# Patient Record
Sex: MALE | Race: WHITE | NOT HISPANIC OR LATINO | Employment: OTHER | URBAN - METROPOLITAN AREA
[De-identification: names, ages, dates, MRNs, and addresses within clinical notes are randomized per-mention and may not be internally consistent; named-entity substitution may affect disease eponyms.]

---

## 2017-10-07 ENCOUNTER — HOSPITAL ENCOUNTER (EMERGENCY)
Facility: CLINIC | Age: 67
Discharge: HOME | End: 2017-10-07
Attending: EMERGENCY MEDICINE | Admitting: EMERGENCY MEDICINE
Payer: COMMERCIAL

## 2017-10-07 VITALS
SYSTOLIC BLOOD PRESSURE: 154 MMHG | TEMPERATURE: 98.7 F | OXYGEN SATURATION: 94 % | RESPIRATION RATE: 18 BRPM | DIASTOLIC BLOOD PRESSURE: 97 MMHG

## 2017-10-07 DIAGNOSIS — F10.920 ALCOHOL INTOXICATION, UNCOMPLICATED (H): ICD-10-CM
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
## 2017-10-07 DIAGNOSIS — F10.120 ALCOHOL ABUSE WITH UNCOMPLICATED INTOXICATION (H): ICD-10-CM

## 2017-10-07 DIAGNOSIS — G20.A1 PARKINSON'S DISEASE (H): ICD-10-CM

## 2017-10-07 LAB
ALBUMIN SERPL-MCNC: 3.2 G/DL (ref 3.4–5)
ALP SERPL-CCNC: 90 U/L (ref 40–150)
ALT SERPL W P-5'-P-CCNC: 42 U/L (ref 0–70)
ANION GAP SERPL CALCULATED.3IONS-SCNC: 13 MMOL/L (ref 3–14)
AST SERPL W P-5'-P-CCNC: 109 U/L (ref 0–45)
BASOPHILS # BLD AUTO: 0 10E9/L (ref 0–0.2)
BASOPHILS NFR BLD AUTO: 0.2 %
BILIRUB SERPL-MCNC: 0.6 MG/DL (ref 0.2–1.3)
BUN SERPL-MCNC: 16 MG/DL (ref 7–30)
CALCIUM SERPL-MCNC: 8.2 MG/DL (ref 8.5–10.1)
CHLORIDE SERPL-SCNC: 102 MMOL/L (ref 94–109)
CO2 SERPL-SCNC: 20 MMOL/L (ref 20–32)
CREAT SERPL-MCNC: 0.86 MG/DL (ref 0.66–1.25)
DIFFERENTIAL METHOD BLD: ABNORMAL
EOSINOPHIL # BLD AUTO: 0 10E9/L (ref 0–0.7)
EOSINOPHIL NFR BLD AUTO: 0 %
ERYTHROCYTE [DISTWIDTH] IN BLOOD BY AUTOMATED COUNT: 14 % (ref 10–15)
ETHANOL SERPL-MCNC: 0.14 G/DL
GFR SERPL CREATININE-BSD FRML MDRD: 90 ML/MIN/1.7M2
GLUCOSE SERPL-MCNC: 132 MG/DL (ref 70–99)
HCT VFR BLD AUTO: 42.6 % (ref 40–53)
HGB BLD-MCNC: 14.9 G/DL (ref 13.3–17.7)
IMM GRANULOCYTES # BLD: 0 10E9/L (ref 0–0.4)
IMM GRANULOCYTES NFR BLD: 0.2 %
INR PPP: 0.99 (ref 0.86–1.14)
LIPASE SERPL-CCNC: 93 U/L (ref 73–393)
LYMPHOCYTES # BLD AUTO: 2 10E9/L (ref 0.8–5.3)
LYMPHOCYTES NFR BLD AUTO: 15.3 %
MCH RBC QN AUTO: 30.9 PG (ref 26.5–33)
MCHC RBC AUTO-ENTMCNC: 35 G/DL (ref 31.5–36.5)
MCV RBC AUTO: 88 FL (ref 78–100)
MONOCYTES # BLD AUTO: 1.8 10E9/L (ref 0–1.3)
MONOCYTES NFR BLD AUTO: 13.9 %
NEUTROPHILS # BLD AUTO: 9.3 10E9/L (ref 1.6–8.3)
NEUTROPHILS NFR BLD AUTO: 70.4 %
PLATELET # BLD AUTO: 307 10E9/L (ref 150–450)
POTASSIUM SERPL-SCNC: 3.9 MMOL/L (ref 3.4–5.3)
PROT SERPL-MCNC: 7.2 G/DL (ref 6.8–8.8)
RBC # BLD AUTO: 4.82 10E12/L (ref 4.4–5.9)
SODIUM SERPL-SCNC: 135 MMOL/L (ref 133–144)
WBC # BLD AUTO: 13.1 10E9/L (ref 4–11)

## 2017-10-07 PROCEDURE — 85610 PROTHROMBIN TIME: CPT | Performed by: EMERGENCY MEDICINE

## 2017-10-07 PROCEDURE — 25000128 H RX IP 250 OP 636: Performed by: EMERGENCY MEDICINE

## 2017-10-07 PROCEDURE — 80053 COMPREHEN METABOLIC PANEL: CPT | Performed by: EMERGENCY MEDICINE

## 2017-10-07 PROCEDURE — 83690 ASSAY OF LIPASE: CPT | Performed by: EMERGENCY MEDICINE

## 2017-10-07 PROCEDURE — 25000132 ZZH RX MED GY IP 250 OP 250 PS 637: Performed by: EMERGENCY MEDICINE

## 2017-10-07 PROCEDURE — 80320 DRUG SCREEN QUANTALCOHOLS: CPT | Performed by: EMERGENCY MEDICINE

## 2017-10-07 PROCEDURE — 99284 EMERGENCY DEPT VISIT MOD MDM: CPT | Mod: Z6 | Performed by: EMERGENCY MEDICINE

## 2017-10-07 PROCEDURE — 99284 EMERGENCY DEPT VISIT MOD MDM: CPT | Mod: 25 | Performed by: EMERGENCY MEDICINE

## 2017-10-07 PROCEDURE — 96372 THER/PROPH/DIAG INJ SC/IM: CPT | Performed by: EMERGENCY MEDICINE

## 2017-10-07 PROCEDURE — 85025 COMPLETE CBC W/AUTO DIFF WBC: CPT | Performed by: EMERGENCY MEDICINE

## 2017-10-07 RX ORDER — THIAMINE HYDROCHLORIDE 100 MG/ML
100 INJECTION, SOLUTION INTRAMUSCULAR; INTRAVENOUS ONCE
Status: COMPLETED | OUTPATIENT
Start: 2017-10-07 | End: 2017-10-07

## 2017-10-07 RX ORDER — CARBIDOPA AND LEVODOPA 25; 100 MG/1; MG/1
2 TABLET ORAL ONCE
Status: COMPLETED | OUTPATIENT
Start: 2017-10-07 | End: 2017-10-07

## 2017-10-07 RX ORDER — CARBIDOPA/LEVODOPA 10MG-100MG
1 TABLET ORAL 3 TIMES DAILY
Status: DISCONTINUED | OUTPATIENT
Start: 2017-10-08 | End: 2017-10-07 | Stop reason: CLARIF

## 2017-10-07 RX ADMIN — THIAMINE HYDROCHLORIDE 100 MG: 100 INJECTION, SOLUTION INTRAMUSCULAR; INTRAVENOUS at 21:33

## 2017-10-07 RX ADMIN — CARBIDOPA AND LEVODOPA 2 TABLET: 25; 100 TABLET ORAL at 22:08

## 2017-10-07 NOTE — ED AVS SNAPSHOT
[facility] Emergency Department    [address] [address]    Phone:  [phone]    Fax:  [phone]                                       Arturo Broderick   MRN: [id number]    Department:  [facility] Emergency Department   Date of Visit:  10/7/2017           After Visit Summary Signature Page     I have received my discharge instructions, and my questions have been answered. I have discussed any challenges I see with this plan with the nurse or doctor.    ..........................................................................................................................................  Patient/Patient Representative Signature      ..........................................................................................................................................  Patient Representative Print Name and Relationship to Patient    ..................................................               ................................................  Date                                            Time    ..........................................................................................................................................  Reviewed by Signature/Title    ...................................................              ..............................................  Date                                                            Time

## 2017-10-07 NOTE — ED AVS SNAPSHOT
[facility]    [address]    [facility] [address]    Phone:  [phone]    Fax:  [phone]                                       Arturo Broderick   MRN: [id number]    Department:  [facility] [place] [facility]   Date of Visit:  10/7/2017           Patient Information     Date Of Birth          1950        Your diagnoses for this visit were:     Parkinson's disease (H)     Alcohol intoxication, uncomplicated (H)        You were seen by Blake Reynoso MD.      Follow-up Information     Follow up with Lazara Castillo NP.    Why:  As needed    Contact information:    [facility]  [address]   [facility] [address]  [phone]          Discharge Instructions         Alcohol Intoxication  Alcohol intoxication occurs when you drink alcohol faster than your liver can remove it from your system. The following facts are important to remember:    It can take 10 minutes or more to start to feel the effects of a drink, so you can easily get more intoxicated than you intended.    One drink may be more than 1 serving of alcohol. Depending on the drink, it can be 2 to 4 servings.    It takes about an hour for your body to metabolize (clear) 1 serving. If you have more than 1 drink, it can take a couple of hours or more.    Many things affect how drinks will affect you, including whether you ve eaten, how fast you drink, your size, how much you normally drink (or not), medicines you take, chronic diseases you have, and gender.  Signs and symptoms of alcohol poisoning  The following are signs and symptoms of alcohol poisoning:  Mild impairment    Reduced inhibitions    Slurred speech    Drowsiness    Decreased fine motor skills  Moderate impairment    Erratic behavior, aggression, depression    Impaired judgment    Confusion    Concentration difficulties    Coordination problems  Severe impairment    Vomiting    Seizures    Unconsciousness    Cold, clammy    Slow or  "irregular breathing    Hypothermia (low body temperature)    Coma  Health effects  Alcohol abuse causes health problems. Sometimes this can happen after only drinking a  little.\" There is no set number of drinks or amount of alcohol that defines too much. The more you drink at one time, and the more frequently you drink determine both the short-term and long-term health effects. It affects all parts of your body and your health, including your:    Brain. Alcohol is a central nervous system depressant. It can damage parts of the brain that affect your balance, memory, thinking, and emotions. It can cause memory loss, blackouts, depression, agitation, sleep cycle changes, and seizures. These changes may or may not be reversible.    Heart and vascular system. Alcohol affects multiple areas. It can damage heart muscle causing cardiomyopathy, which is a weakening and stretching of the heart muscle. This can lead to trouble breathing, an irregular heartbeat, atrial fibrillation, leg swelling, and heart failure. It makes the blood vessels stiffen causing hypertension (high blood pressure). All of these problems increase your risk of having heart attacks or strokes.    Liver. Alcohol causes fat to build up in the liver, affecting its normal function. This increases the risk for hepatitis, leading to abdominal pain, appetite loss, jaundice, bleeding problems, liver fibrosis, and cirrhosis. This in turn can affect your ability to fight off infections, and can cause diabetes. The liver changes prevent it from removing toxins in your blood that can cause encephalopathy. Signs of this are confusion, altered level of consciousness, personality changes, memory loss, seizures, coma, and death.    Pancreas. Alcohol can cause inflammation of the pancreas, or pancreatitis. This can cause pain in your abdomen, fever, and diabetes.    Immune system. Alcohol weakens your immune system in a number of ways. It suppresses your immune system " making it harder to fight off infections and colds. You will also have a higher risk of certain infections like pneumonia and tuberculosis.    Cancer risk. Alcohol raises your risk of cancer of the mouth, esophagus, pharynx, larynx, liver, and breast.    Sexual function. Alcohol abuse can also lead to sexual problems.  Alcohol use during pregnancy may cause permanent damage to the growing baby.  Home care  The following guidelines will help you care for yourself at home:    Don't drink any more alcohol.    Don't drive until all effects of the alcohol have worn off.    Don't operate machinery that can cause injuries.    Get lots of rest over the next few days. Drink plenty of water and other non-alcoholic liquids. Try to eat regular meals.    If you have been drinking heavily on a daily basis, you may go through alcohol withdrawal. The usual symptoms last 3 to 4 days and may include nervousness, shakiness, nausea, sweating, sleeplessness, and can even cause seizures and a serious withdrawal symptom called delirium tremens, or DTs. During this time, it is best that you stay with family or friends who can help and support you. You can also admit yourself to a residential detox program. If your symptoms are severe (seizures, severe shakiness, confusion), contact your doctor or call an ambulance for help (see below).   Follow-up care  If alcohol is a problem in your life, these are some organizations that can help you:    Alcoholics Anonymous offers support through a self-help fellowship. There are no dues or fees. See the Yellow Pages and call for time and place of meetings. Find AA online at www.aa.org.    [facility] offers support to families of alcohol users. Contact 033-868-2708, or online at www.al-lee.org.    National [place] on Alcoholism and Drug Dependence can be reached at 394-389-5333, or online at www.ncadd.org.    There are also inpatient and residential alcohol detox programs. Check the Internet or phonebook  Yellow Pages under  Drug Abuse and Treatment Centers.   Call 911  Call 911 if any of these occur:    Trouble breathing or slow irregular breathing    Chest pain    Sudden weakness on one side of your body or sudden trouble speaking    Heavy bleeding or vomiting blood    Very drowsy or trouble awakening    Fainting or loss of consciousness    Rapid heart rate    Seizure  When to seek medical advice  Call your healthcare provider right away if any of these occur:    Severe shakiness     Fever over 100.4  F (38.0  C)    Confusion or hallucinations (seeing, hearing, or feeling things that are not there)    Pain in your upper abdomen that gets worse    Repeated vomiting  Date Last Reviewed: 6/1/2016 2000-2017 [employer]. [address] [place] [address], [place] [address]. All rights reserved. This information is not intended as a substitute for professional medical care. Always follow your healthcare professional's instructions.          24 Hour Appointment Hotline       To make an appointment at any [facility], call [phone] ([phone]). If you don't have a family doctor or clinic, we will help you find one. [facility] clinics are conveniently located to serve the needs of you and your family.             Review of your medicines      Our records show that you are taking the medicines listed below. If these are incorrect, please call your family doctor or clinic.        Dose / Directions Last dose taken    carbidopa-levodopa  MG per tablet   Commonly known as:  SINEMET   Dose:  1 tablet        Take 1 tablet by mouth 3 times daily   Refills:  0        LISINOPRIL PO        Refills:  0        METFORMIN HCL PO        Refills:  0        SIMVASTATIN PO        Refills:  0        TRAZODONE HCL PO        Refills:  0        VENLAFAXINE HCL PO        Refills:  0                Procedures and tests performed during your visit     Alcohol ethyl    CBC with platelets differential    Comprehensive  metabolic panel    INR    Lipase      Orders Needing Specimen Collection     None      Pending Results     No orders found from 10/5/2017 to 10/8/2017.            Pending Culture Results     No orders found from 10/5/2017 to 10/8/2017.            Pending Results Instructions     If you had any lab results that were not finalized at the time of your Discharge, you can call the ED Lab Result RN at [phone]. You will be contacted by this team for any positive Lab results or changes in treatment. The nurses are available 7 days a week from 10A to 6:30P.  You can leave a message 24 hours per day and they will return your call.        Test Results From Your Hospital Stay        10/7/2017  9:47 PM      Component Results     Component Value Ref Range & Units Status    WBC 13.1 (H) 4.0 - 11.0 10e9/L Final    RBC Count 4.82 4.4 - 5.9 10e12/L Final    Hemoglobin 14.9 13.3 - 17.7 g/dL Final    Hematocrit 42.6 40.0 - 53.0 % Final    MCV 88 78 - 100 fl Final    MCH 30.9 26.5 - 33.0 pg Final    MCHC 35.0 31.5 - 36.5 g/dL Final    RDW 14.0 10.0 - 15.0 % Final    Platelet Count 307 150 - 450 10e9/L Final    Diff Method Automated Method  Final    % Neutrophils 70.4 % Final    % Lymphocytes 15.3 % Final    % Monocytes 13.9 % Final    % Eosinophils 0.0 % Final    % Basophils 0.2 % Final    % Immature Granulocytes 0.2 % Final    Absolute Neutrophil 9.3 (H) 1.6 - 8.3 10e9/L Final    Absolute Lymphocytes 2.0 0.8 - 5.3 10e9/L Final    Absolute Monocytes 1.8 (H) 0.0 - 1.3 10e9/L Final    Absolute Eosinophils 0.0 0.0 - 0.7 10e9/L Final    Absolute Basophils 0.0 0.0 - 0.2 10e9/L Final    Abs Immature Granulocytes 0.0 0 - 0.4 10e9/L Final         10/7/2017 10:00 PM      Component Results     Component Value Ref Range & Units Status    INR 0.99 0.86 - 1.14 Final         10/7/2017 10:01 PM      Component Results     Component Value Ref Range & Units Status    Sodium 135 133 - 144 mmol/L Final    Potassium 3.9 3.4 - 5.3 mmol/L Final     "Chloride 102 94 - 109 mmol/L Final    Carbon Dioxide 20 20 - 32 mmol/L Final    Anion Gap 13 3 - 14 mmol/L Final    Glucose 132 (H) 70 - 99 mg/dL Final    Urea Nitrogen 16 7 - 30 mg/dL Final    Creatinine 0.86 0.66 - 1.25 mg/dL Final    GFR Estimate 90 >60 mL/min/1.7m2 Final    Non  GFR Calc    GFR Estimate If Black >90 >60 mL/min/1.7m2 Final    African American GFR Calc    Calcium 8.2 (L) 8.5 - 10.1 mg/dL Final    Bilirubin Total 0.6 0.2 - 1.3 mg/dL Final    Albumin 3.2 (L) 3.4 - 5.0 g/dL Final    Protein Total 7.2 6.8 - 8.8 g/dL Final    Alkaline Phosphatase 90 40 - 150 U/L Final    ALT 42 0 - 70 U/L Final     (H) 0 - 45 U/L Final         10/7/2017  9:58 PM      Component Results     Component Value Ref Range & Units Status    Lipase 93 73 - 393 U/L Final         10/7/2017  9:58 PM      Component Results     Component Value Ref Range & Units Status    Ethanol g/dL 0.14 (H) <0.01 g/dL Final                Thank you for choosing [facility]       Thank you for choosing [facility] for your care. Our goal is always to provide you with excellent care. Hearing back from our patients is one way we can continue to improve our services. Please take a few minutes to complete the written survey that you may receive in the mail after you visit with us. Thank you!        [employer] Information     [employer] lets you send messages to your doctor, view your test results, renew your prescriptions, schedule appointments and more. To sign up, go to [email or web address] . Click on \"Log in\" on the left side of the screen, which will take you to the Welcome page. Then click on \"Sign up Now\" on the right side of the page.     You will be asked to enter the access code listed below, as well as some personal information. Please follow the directions to create your username and password.     Your access code is: [email or web address]  Expires: 2018 10:50 PM     Your access code will  in 90 days. If you need help or a new " code, please call your [facility] clinic or [phone].        Care EveryWhere ID     This is your Care EveryWhere ID. This could be used by other organizations to access your [facility] medical records  [id number]        Equal Access to Services     FARHAT TURNER : Arnie richards Soalize, waaxda luqadaha, qaybta kaalmada soledad, delicia mckeon. So [facility] [phone].    ATENCIÓN: Si habla español, tiene a cazares disposición servicios gratuitos de asistencia lingüística. Llame al [phone].    We comply with applicable federal civil rights laws and [place] laws. We do not discriminate on the basis of race, color, national origin, age, disability, sex, sexual orientation, or gender identity.            After Visit Summary       This is your record. Keep this with you and show to your community pharmacist(s) and doctor(s) at your next visit.

## 2017-10-08 NOTE — ED PROVIDER NOTES
History     Chief Complaint   Patient presents with     Alcohol Intoxication     EMS CALLED OUT TO ASSIST OUT OF CHAIR, HOME ALONE, TOO INTOXICATED TO STAY THERE     HPI  Arturo Broderick is a 66 year old male with a past medical history of Parkinson's disease, on carbidopa-levodopa, who presents to the ED via EMS for the evaluation of alcohol intoxication. Patient states he had 3-4 drinks tonight. Due to his intoxicated state and Parkinson's disease, patient was unable to stand up after falling out of bed earlier tonight, at which point he called 911.  He denies injury with fall.  He reports being able to get into a chair before EMS arrival but being unable to get out of the chair without assistance. He admits to drinking most days, but denies any problems with alcohol dependence and overuse.  Paramedics were quite concerned and the patient would not contact family to come watch him so they brought him in for evaluation and possible transfer to detox.  Patient is concerned with his parkinsonism that he does not have his evening meds and he is due.  We will provide this for him.  Currently he denies headache, chest pain, shortness of breath, abdominal pain, diarrhea or dysuria.  No fever or chills.    Past Medical History:   Diagnosis Date     Arthritis      Parkinson's disease (H) 2011     There is no problem list on file for this patient.    No current facility-administered medications for this encounter.      Current Outpatient Prescriptions   Medication     carbidopa-levodopa (SINEMET)  MG per tablet     LISINOPRIL PO     SIMVASTATIN PO     VENLAFAXINE HCL PO     METFORMIN HCL PO     TRAZODONE HCL PO      No Known Allergies  Social History     Social History     Marital status:      Spouse name: N/A     Number of children: N/A     Years of education: N/A     Occupational History     Not on file.     Social History Main Topics     Smoking status: Never Smoker     Smokeless tobacco: Not on file      Alcohol use Not on file     Drug use: Not on file     Sexual activity: Not on file     Other Topics Concern     Not on file     Social History Narrative     No narrative on file     Family History   Problem Relation Age of Onset     Dementia Mother      Unknown/Adopted No family hx of      Depression No family hx of      Anxiety Disorder No family hx of      Schizophrenia No family hx of      Bipolar Disorder No family hx of      Suicide No family hx of      Substance Abuse No family hx of      [place] Disease No family hx of      Parkinsonism No family hx of      Autism Spectrum Disorder No family hx of      Intellectual Disability (Mental Retardation) No family hx of      MENTAL ILLNESS No family hx of      I have reviewed the Medications, Allergies, Past Medical and Surgical History, and Social History in the Epic system.    Review of Systems   Constitutional:        Positive for alcohol intoxication   All other systems reviewed and are negative.      Physical Exam   BP: (!) 155/97  Heart Rate: 116  Temp: 98.7  F (37.1  C)  Resp: 18  SpO2: 95 %  Physical Exam general alert pleasant and cooperative male in mild distress.  HEENT does have flat facies consistent with his parkinsonism.  No limitation in movement of his neck.  Lungs were clear without adventitious sounds.  Cardiac regular without murmur.  Abdomen is obese with active bowel sounds on palpation no localizing upper abdominal tenderness.  No organomegaly or masses.  Neurologically he does have some rigidity and cogwheeling otherwise nonfocal.    ED Course     ED Course     Procedures             Critical Care time:  none               Labs Ordered and Resulted from Time of ED Arrival Up to the Time of Departure from the ED   CBC WITH PLATELETS DIFFERENTIAL - Abnormal; Notable for the following:        Result Value    WBC 13.1 (*)     Absolute Neutrophil 9.3 (*)     Absolute Monocytes 1.8 (*)     All other components within normal limits   COMPREHENSIVE  METABOLIC PANEL - Abnormal; Notable for the following:     Glucose 132 (*)     Calcium 8.2 (*)     Albumin 3.2 (*)      (*)     All other components within normal limits   ALCOHOL ETHYL - Abnormal; Notable for the following:     Ethanol g/dL 0.14 (*)     All other components within normal limits   INR   LIPASE     Results for orders placed or performed during the hospital encounter of 10/07/17 (from the past 24 hour(s))   CBC with platelets differential   Result Value Ref Range    WBC 13.1 (H) 4.0 - 11.0 10e9/L    RBC Count 4.82 4.4 - 5.9 10e12/L    Hemoglobin 14.9 13.3 - 17.7 g/dL    Hematocrit 42.6 40.0 - 53.0 %    MCV 88 78 - 100 fl    MCH 30.9 26.5 - 33.0 pg    MCHC 35.0 31.5 - 36.5 g/dL    RDW 14.0 10.0 - 15.0 %    Platelet Count 307 150 - 450 10e9/L    Diff Method Automated Method     % Neutrophils 70.4 %    % Lymphocytes 15.3 %    % Monocytes 13.9 %    % Eosinophils 0.0 %    % Basophils 0.2 %    % Immature Granulocytes 0.2 %    Absolute Neutrophil 9.3 (H) 1.6 - 8.3 10e9/L    Absolute Lymphocytes 2.0 0.8 - 5.3 10e9/L    Absolute Monocytes 1.8 (H) 0.0 - 1.3 10e9/L    Absolute Eosinophils 0.0 0.0 - 0.7 10e9/L    Absolute Basophils 0.0 0.0 - 0.2 10e9/L    Abs Immature Granulocytes 0.0 0 - 0.4 10e9/L   INR   Result Value Ref Range    INR 0.99 0.86 - 1.14   Comprehensive metabolic panel   Result Value Ref Range    Sodium 135 133 - 144 mmol/L    Potassium 3.9 3.4 - 5.3 mmol/L    Chloride 102 94 - 109 mmol/L    Carbon Dioxide 20 20 - 32 mmol/L    Anion Gap 13 3 - 14 mmol/L    Glucose 132 (H) 70 - 99 mg/dL    Urea Nitrogen 16 7 - 30 mg/dL    Creatinine 0.86 0.66 - 1.25 mg/dL    GFR Estimate 90 >60 mL/min/1.7m2    GFR Estimate If Black >90 >60 mL/min/1.7m2    Calcium 8.2 (L) 8.5 - 10.1 mg/dL    Bilirubin Total 0.6 0.2 - 1.3 mg/dL    Albumin 3.2 (L) 3.4 - 5.0 g/dL    Protein Total 7.2 6.8 - 8.8 g/dL    Alkaline Phosphatase 90 40 - 150 U/L    ALT 42 0 - 70 U/L     (H) 0 - 45 U/L   Lipase   Result Value Ref  Range    Lipase 93 73 - 393 U/L   Alcohol ethyl   Result Value Ref Range    Ethanol g/dL 0.14 (H) <0.01 g/dL       Medications   thiamine (B-1) injection 100 mg (100 mg Intramuscular Given 10/7/17 2133)   carbidopa-levodopa (SINEMET)  MG per tablet 2 tablet (2 tablets Oral Given 10/7/17 2208)       9:27 PM Patient assessed.  Blood work was obtained.   Thiamine IM is ordered  11 PM the son did present and I spoke with him independently.  He is willing to assume his father's care.  He admits that his father does have a problem with alcohol and thinks he is an alcoholic.  He apparently has been through treatment recently.  At this point the son does not want further information regarding treatments as he is currently in the system.  Assessments & Plan (with Medical Decision Making)   Patient is a 66-year-old male presents by ambulance after he had inability to get himself off the floor after falling.  No injury with the fall.  Patient has parkinsonism.  He also admits to drinking today and due to this the paramedics did not feel comfortable leaving in by himself.  He would not call his son at that time.  He presents now without complaints but does have concerns as he needs to have his Sinemet dose.  This was provided for him.  Blood work was checked and  he was intoxicated with alcohol level of 0.14.  At this point patient was willing to contact his son and did so.  His son will present and assumed care.  Patient does not feel alcohols problem and does not want treatment.  He's never had withdrawal symptoms, blackouts, or DUI.  He is given information on alcohol intoxication.  Reasons to return to the emergency room distress.  I have reviewed the nursing notes.    I have reviewed the findings, diagnosis, plan and need for follow up with the patient.       New Prescriptions    No medications on file       Final diagnoses:   Parkinson's disease (H)   Alcohol intoxication, uncomplicated (H)     This document serves  as a record of the services and decisions personally performed and made by Blake Reynoso MD. It was created on HIS/HER behalf by   Víctor Abel, a trained medical scribe. The creation of this document is based the provider's statements to the medical scribe.  Víctor Abel 8:39 PM 10/7/2017    Provider:   The information in this document, created by the medical scribe for me, accurately reflects the services I personally performed and the decisions made by me. I have reviewed and approved this document for accuracy prior to leaving the patient care area.  Blake Reynoso MD 8:39 PM 10/7/2017    10/7/2017   [facility] EMERGENCY DEPARTMENT     Blake Reynoso MD  10/07/17 8711

## 2017-10-08 NOTE — ED NOTES
Here due to intoxication because did not want to call son to be with him, explained that he would either have to call son or other responsible adult to go home so that they could stay with him or we could send him to detox where they could watch him.

## 2017-10-08 NOTE — ED NOTES
Discussed Sinemet order with MD.  Verified with care everywhere and patient that dose is 2 tablets of 25/100.

## 2017-10-08 NOTE — DISCHARGE INSTRUCTIONS
"  Alcohol Intoxication  Alcohol intoxication occurs when you drink alcohol faster than your liver can remove it from your system. The following facts are important to remember:    It can take 10 minutes or more to start to feel the effects of a drink, so you can easily get more intoxicated than you intended.    One drink may be more than 1 serving of alcohol. Depending on the drink, it can be 2 to 4 servings.    It takes about an hour for your body to metabolize (clear) 1 serving. If you have more than 1 drink, it can take a couple of hours or more.    Many things affect how drinks will affect you, including whether you ve eaten, how fast you drink, your size, how much you normally drink (or not), medicines you take, chronic diseases you have, and gender.  Signs and symptoms of alcohol poisoning  The following are signs and symptoms of alcohol poisoning:  Mild impairment    Reduced inhibitions    Slurred speech    Drowsiness    Decreased fine motor skills  Moderate impairment    Erratic behavior, aggression, depression    Impaired judgment    Confusion    Concentration difficulties    Coordination problems  Severe impairment    Vomiting    Seizures    Unconsciousness    Cold, clammy    Slow or irregular breathing    Hypothermia (low body temperature)    Coma  Health effects  Alcohol abuse causes health problems. Sometimes this can happen after only drinking a  little.\" There is no set number of drinks or amount of alcohol that defines too much. The more you drink at one time, and the more frequently you drink determine both the short-term and long-term health effects. It affects all parts of your body and your health, including your:    Brain. Alcohol is a central nervous system depressant. It can damage parts of the brain that affect your balance, memory, thinking, and emotions. It can cause memory loss, blackouts, depression, agitation, sleep cycle changes, and seizures. These changes may or may not be " reversible.    Heart and vascular system. Alcohol affects multiple areas. It can damage heart muscle causing cardiomyopathy, which is a weakening and stretching of the heart muscle. This can lead to trouble breathing, an irregular heartbeat, atrial fibrillation, leg swelling, and heart failure. It makes the blood vessels stiffen causing hypertension (high blood pressure). All of these problems increase your risk of having heart attacks or strokes.    Liver. Alcohol causes fat to build up in the liver, affecting its normal function. This increases the risk for hepatitis, leading to abdominal pain, appetite loss, jaundice, bleeding problems, liver fibrosis, and cirrhosis. This in turn can affect your ability to fight off infections, and can cause diabetes. The liver changes prevent it from removing toxins in your blood that can cause encephalopathy. Signs of this are confusion, altered level of consciousness, personality changes, memory loss, seizures, coma, and death.    Pancreas. Alcohol can cause inflammation of the pancreas, or pancreatitis. This can cause pain in your abdomen, fever, and diabetes.    Immune system. Alcohol weakens your immune system in a number of ways. It suppresses your immune system making it harder to fight off infections and colds. You will also have a higher risk of certain infections like pneumonia and tuberculosis.    Cancer risk. Alcohol raises your risk of cancer of the mouth, esophagus, pharynx, larynx, liver, and breast.    Sexual function. Alcohol abuse can also lead to sexual problems.  Alcohol use during pregnancy may cause permanent damage to the growing baby.  Home care  The following guidelines will help you care for yourself at home:    Don't drink any more alcohol.    Don't drive until all effects of the alcohol have worn off.    Don't operate machinery that can cause injuries.    Get lots of rest over the next few days. Drink plenty of water and other non-alcoholic liquids.  Try to eat regular meals.    If you have been drinking heavily on a daily basis, you may go through alcohol withdrawal. The usual symptoms last 3 to 4 days and may include nervousness, shakiness, nausea, sweating, sleeplessness, and can even cause seizures and a serious withdrawal symptom called delirium tremens, or DTs. During this time, it is best that you stay with family or friends who can help and support you. You can also admit yourself to a residential detox program. If your symptoms are severe (seizures, severe shakiness, confusion), contact your doctor or call an ambulance for help (see below).   Follow-up care  If alcohol is a problem in your life, these are some organizations that can help you:    Alcoholics Anonymous offers support through a self-help fellowship. There are no dues or fees. See the Yellow Pages and call for time and place of meetings. Find AA online at www.aa.org.    [facility] offers support to families of alcohol users. Contact 281-743-9004, or online at www.al-anodayton.org.    [employer] [place] on Alcoholism and Drug Dependence can be reached at 988-765-5130, or online at www.ncadd.org.    There are also inpatient and residential alcohol detox programs. Check the Internet or phonebook Yellow Pages under  D[facility] Treatment [facility].   Call 911  Call 911 if any of these occur:    Trouble breathing or slow irregular breathing    Chest pain    Sudden weakness on one side of your body or sudden trouble speaking    Heavy bleeding or vomiting blood    Very drowsy or trouble awakening    Fainting or loss of consciousness    Rapid heart rate    Seizure  When to seek medical advice  Call your healthcare provider right away if any of these occur:    Severe shakiness     Fever over 100.4  F (38.0  C)    Confusion or hallucinations (seeing, hearing, or feeling things that are not there)    Pain in your upper abdomen that gets worse    Repeated vomiting  Date Last Reviewed: 6/1/2016 2000-2017 [employer]  [employer]. [address] [place] [address], [place] [address]. All rights reserved. This information is not intended as a substitute for professional medical care. Always follow your healthcare professional's instructions.

## 2023-04-08 ENCOUNTER — APPOINTMENT (OUTPATIENT)
Dept: GENERAL RADIOLOGY | Facility: CLINIC | Age: 73
End: 2023-04-08
Attending: FAMILY MEDICINE
Payer: COMMERCIAL

## 2023-04-08 ENCOUNTER — HOSPITAL ENCOUNTER (EMERGENCY)
Facility: CLINIC | Age: 73
Discharge: HOME | End: 2023-04-08
Attending: FAMILY MEDICINE | Admitting: FAMILY MEDICINE
Payer: COMMERCIAL

## 2023-04-08 VITALS
HEART RATE: 84 BPM | WEIGHT: 210 LBS | HEIGHT: 68 IN | OXYGEN SATURATION: 98 % | SYSTOLIC BLOOD PRESSURE: 156 MMHG | TEMPERATURE: 97.8 F | DIASTOLIC BLOOD PRESSURE: 87 MMHG | RESPIRATION RATE: 16 BRPM | BODY MASS INDEX: 31.83 KG/M2

## 2023-04-08 DIAGNOSIS — S60.221A CONTUSION OF RIGHT HAND INCLUDING FINGERS, INITIAL ENCOUNTER: ICD-10-CM

## 2023-04-08 DIAGNOSIS — S60.00XA CONTUSION OF RIGHT HAND INCLUDING FINGERS, INITIAL ENCOUNTER: ICD-10-CM

## 2023-04-08 DIAGNOSIS — S62.502B: ICD-10-CM

## 2023-04-08 DIAGNOSIS — S59.902A ELBOW INJURY, LEFT, INITIAL ENCOUNTER: ICD-10-CM

## 2023-04-08 PROCEDURE — 73070 X-RAY EXAM OF ELBOW: CPT | Mod: LT

## 2023-04-08 PROCEDURE — 73130 X-RAY EXAM OF HAND: CPT | Mod: LT

## 2023-04-08 PROCEDURE — 99285 EMERGENCY DEPT VISIT HI MDM: CPT | Performed by: FAMILY MEDICINE

## 2023-04-08 PROCEDURE — 26750 TREAT FINGER FRACTURE EACH: CPT | Mod: 54 | Performed by: FAMILY MEDICINE

## 2023-04-08 PROCEDURE — 99284 EMERGENCY DEPT VISIT MOD MDM: CPT | Mod: 25 | Performed by: FAMILY MEDICINE

## 2023-04-08 PROCEDURE — 12002 RPR S/N/AX/GEN/TRNK2.6-7.5CM: CPT | Performed by: FAMILY MEDICINE

## 2023-04-08 PROCEDURE — 26750 TREAT FINGER FRACTURE EACH: CPT | Mod: LT | Performed by: FAMILY MEDICINE

## 2023-04-08 PROCEDURE — 250N000013 HC RX MED GY IP 250 OP 250 PS 637: Performed by: FAMILY MEDICINE

## 2023-04-08 RX ORDER — HYDROCODONE BITARTRATE AND ACETAMINOPHEN 5; 325 MG/1; MG/1
2 TABLET ORAL ONCE
Status: COMPLETED | OUTPATIENT
Start: 2023-04-08 | End: 2023-04-08

## 2023-04-08 RX ORDER — CEPHALEXIN 500 MG/1
500 CAPSULE ORAL 2 TIMES DAILY
Qty: 20 CAPSULE | Refills: 0 | Status: SHIPPED | OUTPATIENT
Start: 2023-04-08

## 2023-04-08 RX ORDER — OXYCODONE AND ACETAMINOPHEN 5; 325 MG/1; MG/1
1 TABLET ORAL EVERY 6 HOURS PRN
Qty: 12 TABLET | Refills: 0 | Status: SHIPPED | OUTPATIENT
Start: 2023-04-08

## 2023-04-08 RX ORDER — OXYCODONE HYDROCHLORIDE 5 MG/1
5 TABLET ORAL EVERY 6 HOURS PRN
Qty: 6 TABLET | Refills: 0 | Status: SHIPPED | OUTPATIENT
Start: 2023-04-08 | End: 2023-04-08

## 2023-04-08 RX ORDER — CEPHALEXIN 500 MG/1
500 CAPSULE ORAL ONCE
Status: COMPLETED | OUTPATIENT
Start: 2023-04-08 | End: 2023-04-08

## 2023-04-08 RX ORDER — HYDROCODONE BITARTRATE AND ACETAMINOPHEN 5; 325 MG/1; MG/1
1-2 TABLET ORAL EVERY 6 HOURS PRN
Qty: 10 TABLET | Refills: 0 | Status: SHIPPED | OUTPATIENT
Start: 2023-04-08 | End: 2023-04-08

## 2023-04-08 RX ADMIN — HYDROCODONE BITARTRATE AND ACETAMINOPHEN 2 TABLET: 5; 325 TABLET ORAL at 21:08

## 2023-04-08 RX ADMIN — CEPHALEXIN 500 MG: 500 CAPSULE ORAL at 22:42

## 2023-04-08 ASSESSMENT — ACTIVITIES OF DAILY LIVING (ADL)
ADLS_ACUITY_SCORE: 35
ADLS_ACUITY_SCORE: 35

## 2023-04-09 NOTE — ED PROVIDER NOTES
HPI   Patient is a 72-year-old man presenting by private car with his wife after a fall.  He has a known history of Parkinson's disease.  This is advanced and he is scheduled for a deep brain stimulator.  He does not take medication for anticoagulation.  The rest of his medical history is reviewed within the care everywhere section.    The patient was walking out of his home when he slipped on the bottom stair and fell forward, landing onto the pavement.  He denies head trauma or LOC.  He denies neck or back pain.  He denies chest pain or shortness of breath.  He denies abdominal pain.  He was helped to his feet by bystanders and he was able to bear weight and ambulate slowly after the fall.  He reports moderate pain involving his left thumb.  There is an obvious laceration with bleeding and deformity.  He has swelling of his right hand over the third MCP joint.  He has abrasions on both knees.        Allergies:  No Known Allergies  Problem List:    There are no problems to display for this patient.     Past Medical History:    Past Medical History:   Diagnosis Date     Arthritis      Parkinson's disease (H) 2011     Past Surgical History:    Past Surgical History:   Procedure Laterality Date     COLONOSCOPY       ORTHOPEDIC SURGERY      both knees     Family History:    Family History   Problem Relation Age of Onset     Dementia Mother      Unknown/Adopted No family hx of      Depression No family hx of      Anxiety Disorder No family hx of      Schizophrenia No family hx of      Bipolar Disorder No family hx of      Suicide No family hx of      Substance Abuse No family hx of      [place] Disease No family hx of      Parkinsonism No family hx of      Autism Spectrum Disorder No family hx of      Intellectual Disability (Mental Retardation) No family hx of      Mental Illness No family hx of      Social History:  Marital Status:   [2]  Social History     Tobacco Use     Smoking status: Never     "  Medications:    HYDROcodone-acetaminophen (NORCO) 5-325 MG tablet  carbidopa-levodopa (SINEMET)  MG per tablet  LISINOPRIL PO  METFORMIN HCL PO  SIMVASTATIN PO  TRAZODONE HCL PO  VENLAFAXINE HCL PO      Review of Systems   All other systems reviewed and are negative.      PE   BP: (!) 165/85  Pulse: 75  Temp: 97.8  F (36.6  C)  Resp: 18  Height: 172.7 cm (5' 8\")  Weight: 95.3 kg (210 lb)  SpO2: 97 %  Physical Exam  Vitals and nursing note reviewed.   Constitutional:       General: He is not in acute distress.  HENT:      Head: Atraumatic.      Right Ear: External ear normal.      Left Ear: External ear normal.      Nose: Nose normal.      Mouth/Throat:      Mouth: Mucous membranes are moist.      Pharynx: Oropharynx is clear.   Eyes:      General: No scleral icterus.     Extraocular Movements: Extraocular movements intact.      Conjunctiva/sclera: Conjunctivae normal.      Pupils: Pupils are equal, round, and reactive to light.   Cardiovascular:      Rate and Rhythm: Normal rate.   Pulmonary:      Effort: Pulmonary effort is normal. No respiratory distress.   Musculoskeletal:         General: Normal range of motion.      Cervical back: Normal range of motion.      Comments: Right thumb appears to be broken or dislocated.  There is a deformity at the distal interphalangeal joint or at the proximal phalanx.  There is a large laceration along the hand side.  He is not able to extend or flex the thumb.    There is a large hematoma over his right third MCP joint, dorsum of the hand.    Abrasions over both knees, anterior aspect.  Full range of motion of the knees and hips without pain.  Otherwise not tender to palpation over major muscles, joints, long bones.     Skin:     General: Skin is warm and dry.      Comments: See below.   Neurological:      Mental Status: He is alert and oriented to person, place, and time.   Psychiatric:         Behavior: Behavior normal.         ED COURSE and MDM   2105.  Patient has " symptoms and signs as described above.  Patient has obvious injuries to both hands.  X-rays pending.  I will have a better look at the right thumb after pain medication and x-ray complete.  I will then anesthetize the wound and soak it in order to clean it well.    2237.  Open fracture dislocation of the left thumb.  The wound was anesthetized locally and then reduced.  Clinical reduction obvious.  The 3 cm laceration was closed with suture.  I placed lidocaine without epinephrine into the wound for anesthesia.  The wound was cleaned with soap and water.  I then placed #5 simple interrupted stitches with Ethilon 3-0 suture.  Petroleum based dressing and then splint for protection and immobilization.  Orthopedic referral order placed, 1 to 2 days.  Keflex given here in the ED.  A prescription for Keflex and hydrocodone/acetaminophen provided.  Elbow effusion on the left.  I will provide a sling.  Contusion on the right hand.    Electronic medical chart reviewed, including medical problems, medications, medical allergies, social history.  Recent hospitalizations and surgical procedures reviewed.  Recent clinic visits and consultations reviewed.  Recent labs and test results reviewed.  Nursing notes reviewed.    2240.  The patient, their parent if applicable, and/or their medical decision maker(s) and I have reviewed all of the available historical information, applicable PMH, physical exam findings, and objective diagnostic data gathered during this ED visit.  We then discussed all work-up options and then together agreed upon the course taken during this visit.  The ultimate disposition and plan was a cooperative decision made between myself and the patient, their parent if applicable, and/or their legal decision maker(s).  The risks and benefits of all decisions made during this visit were discussed to the best of my abilities given the circumstances, and all parties are understanding of the pertinent ramifications  of these decisions.      LABS  Labs Ordered and Resulted from Time of ED Arrival to Time of ED Departure - No data to display    IMAGING  Images reviewed by me.  Radiology report also reviewed.  Elbow XR, 2 view, left   Final Result   IMPRESSION: There is a joint effusion. No fracture or dislocation is evident. Occult fracture is a possibility and follow-up is suggested as clinically warranted.      XR Hand Right G/E 3 Views   Final Result   IMPRESSION: No acute fracture or dislocation. Arthritis in the wrist.      XR Hand Left G/E 3 Views   Final Result   IMPRESSION: There is a fracture and dislocation of the distal phalanx of the thumb. No other fracture or dislocation. Arthritis in the wrist.          Procedures    Medications   cephALEXin (KEFLEX) capsule 500 mg (has no administration in time range)   HYDROcodone-acetaminophen (NORCO) 5-325 MG per tablet 2 tablet (2 tablets Oral $Given 4/8/23 5615)         IMPRESSION       ICD-10-CM    1. Open fracture dislocation of interphalangeal joint of left thumb, initial encounter  S62.502B Orthopedic  Referral      2. Elbow injury, left, initial encounter  S59.902A Neck/Shoulder/Back Order Sling; Left      3. Contusion of right hand including fingers, initial encounter  S60.221A     S60.00XA                Medication List      Started    HYDROcodone-acetaminophen 5-325 MG tablet  Commonly known as: NORCO  1-2 tablets, Oral, EVERY 6 HOURS PRN                        Kishore Penny MD  04/08/23 5625

## 2023-04-09 NOTE — ED TRIAGE NOTES
Patient reports he fell going down two steps at home landing on asphalt. Patient has left knee abrasion, left thumb laceration, right hand pain and swelling. Denies hitting head or loss of consciousness.     Triage Assessment     Row Name 04/08/23 2025       Triage Assessment (Adult)    Airway WDL WDL       Respiratory WDL    Respiratory WDL WDL       Skin Circulation/Temperature WDL    Skin Circulation/Temperature WDL X  left knee, left thumb, right hand bruising, swelling, injuries.       Cardiac WDL    Cardiac WDL WDL       Peripheral/Neurovascular WDL    Peripheral Neurovascular WDL WDL       Cognitive/Neuro/Behavioral WDL    Cognitive/Neuro/Behavioral WDL WDL

## 2023-04-09 NOTE — ED NOTES
Pt fell outside down 2 steps landing on concrete.   Pt denies hitting head or on blood thinners.   Pt has hematoma to right middle knuckle, left knee abrasion and left thumb laceration.

## 2023-04-09 NOTE — DISCHARGE INSTRUCTIONS
RETURN TO THE EMERGENCY ROOM FOR THE FOLLOWING:    Severely worsened pain, fever greater than 101, expanding redness/swelling/tenderness, or at anytime for any concern.    FOLLOW UP:    A referral order for orthopedic surgery was placed at the time of your discharge.  Expect a phone call within the next 1-2 business days to help with scheduling.  You need to see a hand surgeon, specifically.  This is designated on the referral order.    TREATMENT RECOMMENDATIONS:    Keflex 500 mg twice a day for 5 days.    Ibuprofen and Tylenol for pain as needed.    Oxycodone 1 tablet every 6 hours as needed for pain that is not relieved by the above    NURSE ADVICE LINE:  [phone] or [phone]

## 2023-04-15 ENCOUNTER — APPOINTMENT (OUTPATIENT)
Dept: GENERAL RADIOLOGY | Facility: CLINIC | Age: 73
End: 2023-04-15
Attending: EMERGENCY MEDICINE
Payer: COMMERCIAL

## 2023-04-15 ENCOUNTER — HOSPITAL ENCOUNTER (EMERGENCY)
Facility: CLINIC | Age: 73
Discharge: HOME | End: 2023-04-15
Attending: EMERGENCY MEDICINE | Admitting: EMERGENCY MEDICINE
Payer: COMMERCIAL

## 2023-04-15 ENCOUNTER — APPOINTMENT (OUTPATIENT)
Dept: GENERAL RADIOLOGY | Facility: CLINIC | Age: 73
End: 2023-04-15
Attending: STUDENT IN AN ORGANIZED HEALTH CARE EDUCATION/TRAINING PROGRAM
Payer: COMMERCIAL

## 2023-04-15 VITALS
OXYGEN SATURATION: 94 % | HEIGHT: 68 IN | DIASTOLIC BLOOD PRESSURE: 89 MMHG | BODY MASS INDEX: 31.83 KG/M2 | WEIGHT: 210 LBS | SYSTOLIC BLOOD PRESSURE: 164 MMHG | RESPIRATION RATE: 18 BRPM | HEART RATE: 85 BPM | TEMPERATURE: 97.9 F

## 2023-04-15 DIAGNOSIS — S63.124A CLOSED DISLOCATION OF INTERPHALANGEAL JOINT OF RIGHT THUMB, INITIAL ENCOUNTER: ICD-10-CM

## 2023-04-15 PROCEDURE — 99284 EMERGENCY DEPT VISIT MOD MDM: CPT | Mod: 25 | Performed by: EMERGENCY MEDICINE

## 2023-04-15 PROCEDURE — 999N000065 XR FINGER PORT LEFT G/E 2 VIEWS

## 2023-04-15 PROCEDURE — 73140 X-RAY EXAM OF FINGER(S): CPT | Mod: LT

## 2023-04-15 PROCEDURE — 26641 TREAT THUMB DISLOCATION: CPT

## 2023-04-15 PROCEDURE — 26641 TREAT THUMB DISLOCATION: CPT | Mod: RT | Performed by: EMERGENCY MEDICINE

## 2023-04-15 PROCEDURE — 99283 EMERGENCY DEPT VISIT LOW MDM: CPT | Mod: 57 | Performed by: EMERGENCY MEDICINE

## 2023-04-15 RX ORDER — CARBIDOPA AND LEVODOPA 25; 100 MG/1; MG/1
1 TABLET ORAL
COMMUNITY
Start: 2023-04-03

## 2023-04-15 RX ORDER — CARBIDOPA AND LEVODOPA 50; 200 MG/1; MG/1
1 TABLET, EXTENDED RELEASE ORAL
COMMUNITY
Start: 2023-02-13

## 2023-04-15 ASSESSMENT — ACTIVITIES OF DAILY LIVING (ADL): ADLS_ACUITY_SCORE: 35

## 2023-04-16 ENCOUNTER — APPOINTMENT (OUTPATIENT)
Dept: GENERAL RADIOLOGY | Facility: CLINIC | Age: 73
End: 2023-04-16
Attending: EMERGENCY MEDICINE
Payer: COMMERCIAL

## 2023-04-16 ENCOUNTER — HOSPITAL ENCOUNTER (EMERGENCY)
Facility: CLINIC | Age: 73
Discharge: HOME | End: 2023-04-16
Attending: EMERGENCY MEDICINE | Admitting: EMERGENCY MEDICINE
Payer: COMMERCIAL

## 2023-04-16 VITALS
OXYGEN SATURATION: 97 % | HEART RATE: 97 BPM | TEMPERATURE: 98.2 F | DIASTOLIC BLOOD PRESSURE: 106 MMHG | BODY MASS INDEX: 31.93 KG/M2 | SYSTOLIC BLOOD PRESSURE: 164 MMHG | RESPIRATION RATE: 18 BRPM | WEIGHT: 210 LBS

## 2023-04-16 DIAGNOSIS — S62.502A: ICD-10-CM

## 2023-04-16 PROCEDURE — 99284 EMERGENCY DEPT VISIT MOD MDM: CPT | Mod: 57 | Performed by: EMERGENCY MEDICINE

## 2023-04-16 PROCEDURE — 99284 EMERGENCY DEPT VISIT MOD MDM: CPT | Mod: 25 | Performed by: EMERGENCY MEDICINE

## 2023-04-16 PROCEDURE — 26770 TREAT FINGER DISLOCATION: CPT | Mod: LT | Performed by: EMERGENCY MEDICINE

## 2023-04-16 PROCEDURE — 26770 TREAT FINGER DISLOCATION: CPT | Mod: 54 | Performed by: EMERGENCY MEDICINE

## 2023-04-16 PROCEDURE — 73140 X-RAY EXAM OF FINGER(S): CPT | Mod: LT

## 2023-04-16 NOTE — ED TRIAGE NOTES
"Patient was seen in ED 4/8 after fall. Left thumb fx/dislocation. Tonight ~ 45 minutes prior to arrival he was in the kitchen drinking a glass a water and felt his finger \"snap\". Feels like it is dislocated again.      Triage Assessment     Row Name 04/15/23 2048       Triage Assessment (Adult)    Airway WDL WDL       Respiratory WDL    Respiratory WDL WDL       Skin Circulation/Temperature WDL    Skin Circulation/Temperature WDL X  Left Thumb       Cardiac WDL    Cardiac WDL WDL       Peripheral/Neurovascular WDL    Peripheral Neurovascular WDL WDL       Cognitive/Neuro/Behavioral WDL    Cognitive/Neuro/Behavioral WDL WDL              "

## 2023-04-16 NOTE — ED PROVIDER NOTES
History     Chief Complaint   Patient presents with     Thumb Discomfort     HPI  Arturo Broderick is a 72 year old male who presents for right thumb pain.  The patient initially hurt this about 1 week ago when he fell down stairs.  He has a history of Parkinson's and falls frequently.  I reviewed his emergency department visit on 4/8/2023 for treatment of this.  Since then he relates that he has followed up in orthopedic surgery clinic once.  Tonight he felt that the thumb popped out of place again.  Pain is mild, hurts to move it, he has not taking thing for the pain.  He is on cephalexin for wound infection prophylaxis.    Allergies:  No Known Allergies    Problem List:    There are no problems to display for this patient.       Past Medical History:    Past Medical History:   Diagnosis Date     Arthritis      Parkinson's disease (H) 2011       Past Surgical History:    Past Surgical History:   Procedure Laterality Date     COLONOSCOPY       ORTHOPEDIC SURGERY      both knees       Family History:    Family History   Problem Relation Age of Onset     Dementia Mother      Unknown/Adopted No family hx of      Depression No family hx of      Anxiety Disorder No family hx of      Schizophrenia No family hx of      Bipolar Disorder No family hx of      Suicide No family hx of      Substance Abuse No family hx of      [place] Disease No family hx of      Parkinsonism No family hx of      Autism Spectrum Disorder No family hx of      Intellectual Disability (Mental Retardation) No family hx of      Mental Illness No family hx of        Social History:  Marital Status:   [2]  Social History     Tobacco Use     Smoking status: Never        Medications:    carbidopa-levodopa (SINEMET CR)  MG CR tablet  carbidopa-levodopa (SINEMET)  MG per tablet  carbidopa-levodopa (SINEMET)  MG tablet  cephALEXin (KEFLEX) 500 MG capsule  LISINOPRIL PO  METFORMIN HCL PO  oxyCODONE-acetaminophen (PERCOCET) 5-325  "MG tablet  SIMVASTATIN PO  TRAZODONE HCL PO  VENLAFAXINE HCL PO          Review of Systems    Physical Exam   BP: (!) 164/89  Pulse: 85  Temp: 97.9  F (36.6  C)  Resp: 18  Height: 172.7 cm (5' 8\")  Weight: 95.3 kg (210 lb)  SpO2: 94 %      Physical Exam  Constitutional:       General: He is not in acute distress.     Appearance: He is well-developed. He is not diaphoretic.   HENT:      Head: Normocephalic and atraumatic.   Eyes:      General: No scleral icterus.  Musculoskeletal:      Cervical back: Normal range of motion and neck supple.      Comments: Deformity of the distal phalanx of the right thumb, sensation is intact, laceration has been repaired and does have some surrounding erythema but no discharge, no wound dehiscence, no significant tenderness to palpation   Skin:     General: Skin is warm and dry.      Findings: No rash.   Neurological:      Mental Status: He is alert and oriented to person, place, and time.         ED Course                 [facility]    -Dislocation - Upper Extremity    Date/Time: 4/15/2023 11:21 PM    Performed by: Alejandro Hassan MD  Authorized by: Alejandro Hassan MD    Risks, benefits and alternatives discussed.      LOCATION     Location:  Finger    Finger location:  R thumb    Finger dislocation type: IP      PRE PROCEDURE ASSESSMENT      Pre-procedure imaging:  X-ray    Imaging findings: dislocation present      Distal perfusion: normal      ANESTHESIA (see MAR for exact dosages)      Anesthesia method:  None    PROCEDURE DETAILS      Manipulation performed: yes      Finger reduction method:  Direct traction    Reduction successful: yes      Reduction confirmed with imaging: yes      Immobilization:  Splint    POST PROCEDURE DETAILS     Neurological function: normal      Distal perfusion: normal      Range of motion: improved        PROCEDURE    Patient Tolerance:  Patient tolerated the procedure well with no immediate " complications                Critical Care time:  none               Results for orders placed or performed during the hospital encounter of 04/15/23 (from the past 24 hour(s))   Fingers XR, 2-3 views, left    Narrative    EXAM: XR FINGER LEFT G/E 2 VIEWS  LOCATION: [facility]  DATE/TIME: 4/15/2023 9:18 PM CDT    INDICATION: Left thumb injury and pain.  COMPARISON: 04/08/2023.      Impression    IMPRESSION:  1.  Radial dislocation of the left thumb IP joint, relatively unchanged.  2.  Comminuted fracture of the thumb distal phalanx base. No significant change in fracture fragment orientation or alignment. No new fracture is evident.  3.  Soft tissue irregularity and attenuation at the palmar aspect of the thumb IP joint, slightly improved. No radiodense foreign body.  4.  Remainder unchanged. Wrist chondrocalcinosis. Widened scapholunate interval, consistent with chronic scapholunate ligament tear or insufficiency. 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 Corticated ossicle in the ulna prestyloid   recess.     XR Finger Port Left G/E 2 Views    Narrative    EXAM: XR FINGER PORT LEFT G/E 2 VIEWS  LOCATION: [facility]  DATE/TIME: 4/15/2023 10:13 PM CDT    INDICATION: Post reduction.  COMPARISON: None.      Impression    IMPRESSION: Comminuted displaced fracture of the proximal portion of the left first distal phalanx medially. There is a fracture fragment that is displaced 4 mm medially and 6 mm volarly. Moderate soft tissue swelling about the thumb. The DIP joint   appears subluxed. There is an additional fracture through the proximal portion of the distal phalanx which is mildly displaced. Moderate degenerative changes in the first CMC joint. No definite radiopaque foreign bodies.         Medications - No data to display    Assessments & Plan (with Medical Decision Making)   72-year-old male presents for right thumb pain and sensation of  dislocation.  X-ray of the thumb obtained, images reviewed independently as well as radiology read reviewed, does show a repeat dislocation of the distal phalanx.  The patient tolerated reduction as above.  Repeat x-ray confirms, images reviewed independently.  I was going to place the patient in a splint but he says that he already has a splint from orthopedic surgery for this.  He was not wearing it at that time.  I encouraged him to wear it at all times because the risk is that this joint is very lax and is going to continue to dislocate on its own.  He is safe to discharge with instructions to return if he has worsening of his symptoms or other concerns, continue the antibiotics, otherwise follow-up in orthopedic surgery clinic.  He says he has a follow-up appointment on Tuesday for stitch removal and recheck.  The patient is in agreement with this plan.    I have reviewed the nursing notes.    I have reviewed the findings, diagnosis, plan and need for follow up with the patient.           Medical Decision Making  The patient's presentation was of low complexity (an acute and uncomplicated illness or injury).    The patient's evaluation involved:  review of external note(s) from 1 sources (see separate area of note for details)  ordering and/or review of 2 test(s) in this encounter (see separate area of note for details)  independent interpretation of testing performed by another health professional (see separate area of note for details)    The patient's management necessitated moderate risk (a decision regarding minor procedure (fracture reduction) with risk factors of none).        New Prescriptions    No medications on file       Final diagnoses:   Closed dislocation of interphalangeal joint of right thumb, initial encounter       4/15/2023   [facility] EMERGENCY DEPT     Alejandro Hassan MD  04/15/23 4418

## 2023-04-16 NOTE — ED PROVIDER NOTES
History     Chief Complaint   Patient presents with     Thumb Discomfort     HPI  Arturo Broderick is a 72 year old male who presents to the emergency department with concerns regarding left hand/thumb pain, and concerns regarding repeat dislocation.  Patient had removed his brace, and as he was pulling off his thumb spica prefabricated splint, patient felt as if his thumb had repeat dislocation once again.  No injury elsewhere.  Patient does have frequent, and recurrent ED visits, and was actually just seen last evening for thumb dislocation.  Did have reduction procedure and x-rays performed.  I reviewed that ED visit.    Allergies:  No Known Allergies    Problem List:    There are no problems to display for this patient.       Past Medical History:    Past Medical History:   Diagnosis Date     Arthritis      Parkinson's disease (H) 2011       Past Surgical History:    Past Surgical History:   Procedure Laterality Date     COLONOSCOPY       ORTHOPEDIC SURGERY      both knees       Family History:    Family History   Problem Relation Age of Onset     Dementia Mother      Unknown/Adopted No family hx of      Depression No family hx of      Anxiety Disorder No family hx of      Schizophrenia No family hx of      Bipolar Disorder No family hx of      Suicide No family hx of      Substance Abuse No family hx of      Cherry Disease No family hx of      Parkinsonism No family hx of      Autism Spectrum Disorder No family hx of      Intellectual Disability (Mental Retardation) No family hx of      Mental Illness No family hx of        Social History:  Marital Status:   [2]  Social History     Tobacco Use     Smoking status: Never        Medications:    carbidopa-levodopa (SINEMET CR)  MG CR tablet  carbidopa-levodopa (SINEMET)  MG per tablet  carbidopa-levodopa (SINEMET)  MG tablet  cephALEXin (KEFLEX) 500 MG capsule  LISINOPRIL PO  METFORMIN HCL PO  oxyCODONE-acetaminophen (PERCOCET) 5-325  MG tablet  SIMVASTATIN PO  TRAZODONE HCL PO  VENLAFAXINE HCL PO          Review of Systems  See HPI  Physical Exam   BP: (!) 164/106  Pulse: 97  Temp: 98.2  F (36.8  C)  Resp: 18  Weight: 95.3 kg (210 lb)  SpO2: 97 %      Physical Exam  BP (!) 164/106   Pulse 97   Temp 98.2  F (36.8  C) (Oral)   Resp 18   Wt 95.3 kg (210 lb)   SpO2 97%   BMI 31.93 kg/m    General: alert and in no acute distress  Head: atraumatic, normocephalic  Abd: nondistended  Musculoskel/Extremities: Left thumb with bandage in place, slight radial deviation of the thumb at the interphalangeal joint.  Skin: no rashes, no diaphoresis and skin color normal  Neuro: Patient awake, alert, oriented,    Psychiatric: affect/mood normal,        ED Course                 Procedures              Critical Care time:  none               Results for orders placed or performed during the hospital encounter of 04/16/23 (from the past 24 hour(s))   Fingers XR, 2-3 views, left    Narrative    EXAM: XR FINGER LEFT G/E 2 VIEWS  LOCATION: [facility]  DATE/TIME: 04/16/2023, 8:21 AM CDT    INDICATION: Recurrent dislocations.  Felt dislocation as he pulled off his brace this morning.  Relocated prior to x-ray currently.  Evaluate for subluxation dislocation.  COMPARISON: 04/15/2023 radiographs.      Impression    IMPRESSION: The radial subluxation of the distal phalanx of the thumb is mildly increased since the prior study. No dislocation. The comminuted intra-articular fracture is similar in appearance. No other change.           Medications - No data to display    Assessments & Plan (with Medical Decision Making)  72 year old male, right-hand-dominant, presenting with recurrent left thumb dislocation.  He has had issues with dislocation/subluxation after the patient had a fall originally.  Has been wearing thumb spica brace, however not completely compliant on thumb spica.    Patient with radial deviation of the IP joint.  After  discussion with the patient, decision made for reduction without further digital block, or other anesthesia.  Traction was applied to the distal aspect of the thumb, and did have clicking and popping sensation, with better alignment which was noted.  X-ray images were performed after I did perform reduction procedure in the room.  Patient with slight radial subluxation of the distal phalanx of the thumb which was slightly increased compared to yesterday evening.  However, I feel that this is more chronic in nature now with his comminuted intra-articular fracture which is also present.    Patient had gone to the restroom, and did have repeat episode of dislocation, with clinical examination similar to what the thumb appeared prior to relocation.  I did relocate the thumb once again, and immediately applied thumb spica brace.  Patient is instructed to maintain thumb spica in place constantly until follow-up with orthopedics.  Return instructions discussed if recurrent episodes of dislocation occur.     I have reviewed the nursing notes.    I have reviewed the findings, diagnosis, plan and need for follow up with the patient.               New Prescriptions    No medications on file       Final diagnoses:   Closed fracture subluxation of interphalangeal joint of left thumb, initial encounter       4/16/2023   [facility] EMERGENCY DEPT     Matt, Monroe Caballero MD  04/16/23 2613

## 2023-04-16 NOTE — DISCHARGE INSTRUCTIONS
Wear your splint at all times to protect your thumb.  Continue the antibiotics.  Return to the emergency department for worsening pain, spreading rash, repeated dislocation, or other concerns.  Otherwise follow-up with the surgeon on Tuesday for a recheck.

## 2025-07-18 ENCOUNTER — HOSPITAL ENCOUNTER (EMERGENCY)
Facility: CLINIC | Age: 75
Discharge: HOME | End: 2025-07-18
Attending: PHYSICIAN ASSISTANT | Admitting: PHYSICIAN ASSISTANT
Payer: COMMERCIAL

## 2025-07-18 VITALS
HEART RATE: 64 BPM | DIASTOLIC BLOOD PRESSURE: 75 MMHG | WEIGHT: 188 LBS | OXYGEN SATURATION: 97 % | SYSTOLIC BLOOD PRESSURE: 116 MMHG | BODY MASS INDEX: 28.49 KG/M2 | TEMPERATURE: 98.2 F | HEIGHT: 68 IN

## 2025-07-18 DIAGNOSIS — L08.9 TRAUMATIC HEMATOMA OF RIGHT LOWER LEG WITH INFECTION, INITIAL ENCOUNTER: ICD-10-CM

## 2025-07-18 DIAGNOSIS — S80.11XA TRAUMATIC HEMATOMA OF RIGHT LOWER LEG WITH INFECTION, INITIAL ENCOUNTER: ICD-10-CM

## 2025-07-18 PROCEDURE — 10060 I&D ABSCESS SIMPLE/SINGLE: CPT | Performed by: PHYSICIAN ASSISTANT

## 2025-07-18 PROCEDURE — G0463 HOSPITAL OUTPT CLINIC VISIT: HCPCS | Performed by: PHYSICIAN ASSISTANT

## 2025-07-18 RX ORDER — CEPHALEXIN 500 MG/1
500 CAPSULE ORAL 4 TIMES DAILY
Qty: 28 CAPSULE | Refills: 0 | Status: SHIPPED | OUTPATIENT
Start: 2025-07-18 | End: 2025-07-25

## 2025-07-18 ASSESSMENT — ENCOUNTER SYMPTOMS: CONSTITUTIONAL NEGATIVE: 1

## 2025-07-18 ASSESSMENT — ACTIVITIES OF DAILY LIVING (ADL): ADLS_ACUITY_SCORE: 41

## 2025-07-18 NOTE — ED PROVIDER NOTES
History   No chief complaint on file.    HPI  Arturo Broderick is a 74 year old male who presents to Urgent Care with complaints of localized swelling, redness, and pain to right anterior lower leg.  Patient cannot recall any specific preceding injury or trauma to the area but did notice he was bleeding from the back of this calf about 5 days ago after working in the garage.  Three to four days ago, patient developed localized swelling and redness to the anterior lower leg.  Denies fevers or chills.      Allergies:  No Known Allergies    Problem List:    There are no active problems to display for this patient.       Past Medical History:    Past Medical History:   Diagnosis Date    Arthritis     Parkinson's disease (H) 2011       Past Surgical History:    Past Surgical History:   Procedure Laterality Date    COLONOSCOPY      ORTHOPEDIC SURGERY      both knees       Family History:    Family History   Problem Relation Age of Onset    Dementia Mother     Unknown/Adopted No family hx of     Depression No family hx of     Anxiety Disorder No family hx of     Schizophrenia No family hx of     Bipolar Disorder No family hx of     Suicide No family hx of     Substance Abuse No family hx of     Daksha Disease No family hx of     Parkinsonism No family hx of     Autism Spectrum Disorder No family hx of     Intellectual Disability No family hx of     Mental Illness No family hx of        Social History:  Marital Status:   [2]  Social History     Tobacco Use    Smoking status: Never        Medications:    cephALEXin (KEFLEX) 500 MG capsule  carbidopa-levodopa (SINEMET CR)  MG CR tablet  carbidopa-levodopa (SINEMET)  MG per tablet  carbidopa-levodopa (SINEMET)  MG tablet  cephALEXin (KEFLEX) 500 MG capsule  LISINOPRIL PO  METFORMIN HCL PO  oxyCODONE-acetaminophen (PERCOCET) 5-325 MG tablet  SIMVASTATIN PO  TRAZODONE HCL PO  VENLAFAXINE HCL PO          Review of Systems   Constitutional: Negative.   "  Musculoskeletal:         Right lower leg redness, swelling, pain   All other systems reviewed and are negative.      Physical Exam   BP: 116/75  Pulse: 64  Temp: 98.2  F (36.8  C)  Height: 172.7 cm (5' 8\")  Weight: 85.3 kg (188 lb)  SpO2: 97 %      Physical Exam  Constitutional:       General: He is not in acute distress.     Appearance: Normal appearance. He is well-developed. He is not ill-appearing, toxic-appearing or diaphoretic.   HENT:      Head: Normocephalic and atraumatic.   Eyes:      Conjunctiva/sclera: Conjunctivae normal.   Cardiovascular:      Pulses: Normal pulses.   Pulmonary:      Effort: Pulmonary effort is normal.   Musculoskeletal:         General: Normal range of motion.      Cervical back: Neck supple.      Right lower leg: Swelling and tenderness present.      Comments: Localized area of fluctuance and induration with associated surrounding erythema, warmth, and tenderness.  No active drainage.  There is an abrasion to his posterior right calf that is scabbed over.   Skin:     General: Skin is warm and dry.      Capillary Refill: Capillary refill takes less than 2 seconds.   Neurological:      General: No focal deficit present.      Mental Status: He is alert.      Sensory: Sensation is intact.      Motor: Motor function is intact.         ED [facility]    PROCEDURE: -Incision/Drainage    Date/Time: 7/18/2025 8:00 PM    Performed by: Sharonad Kaye PA-C  Authorized by: Sharonda Kaye PA-C    Risks, benefits and alternatives discussed.      LOCATION:      Type:  Hematoma    Location:  Lower extremity    Lower extremity location:  Leg    Leg location:  R lower leg    PRE-PROCEDURE DETAILS:     Skin preparation:  Chloraprep    ANESTHESIA (see MAR for exact dosages):     Anesthesia method:  Local infiltration    Local anesthetic:  Lidocaine 1% WITH epi    PROCEDURE DETAILS:     Needle aspiration: yes      Needle size:  18 G    Drainage:  Bloody    " Drainage amount:  Moderate    Wound treatment:  Wound left open    PROCEDURE    Patient Tolerance:  Patient tolerated the procedure well with no immediate complications      No results found for this or any previous visit (from the past 24 hours).    Medications - No data to display    Assessments & Plan (with Medical Decision Making)     Pt is a 74 year old male who presents to Urgent Care with complaints of localized swelling, redness, and pain to right anterior lower leg.  Patient cannot recall any specific preceding injury or trauma to the area but did notice he was bleeding from the back of this calf about 5 days ago after working in the garage.  Three to four days ago, patient developed localized swelling and redness to the anterior lower leg.  Denies fevers or chills.    Pt is afebrile on arrival.  Exam as above.  On exam, pt is noted to have localized area of fluctuance and induration with associated surrounding erythema, warmth, and tenderness.  Attempted needle aspiration of the area of fluctuance with return of blood.  Therefore suspect hematoma with associated cellulitis.  Encouraged symptomatic treatments at home.  Return precautions were reviewed.  Hand-outs were provided.    Patient was sent with Keflex and was instructed to follow-up with PCP for continued care and management.  He is to return to the ED for persistent and/or worsening symptoms.  Patient expressed understanding of the diagnosis and plan and was discharged home in good condition.    I have reviewed the nursing notes.    I have reviewed the findings, diagnosis, plan and need for follow up with the patient.    Discharge Medication List as of 7/18/2025 12:30 PM        START taking these medications    Details   !! cephALEXin (KEFLEX) 500 MG capsule Take 1 capsule (500 mg) by mouth 4 times daily for 7 days., Disp-28 capsule, R-0, E-Prescribe       !! - Potential duplicate medications found. Please discuss with provider.          Final  diagnoses:   Traumatic hematoma of right lower leg with infection, initial encounter       7/18/2025   [facility] EMERGENCY DEPT      Disclaimer:  This note consists of symbols derived from keyboarding, dictation and/or voice recognition software.  As a result, there may be errors in the script that have gone undetected.  Please consider this when interpreting information found in this chart.     Sharonda Kaye PA-C  07/18/25 2000       Sharonda Kaye PA-C  07/18/25 2001

## 2025-08-22 ENCOUNTER — HOSPITAL ENCOUNTER (EMERGENCY)
Facility: CLINIC | Age: 75
Discharge: HOME | End: 2025-08-22
Attending: EMERGENCY MEDICINE | Admitting: EMERGENCY MEDICINE
Payer: COMMERCIAL

## 2025-08-22 VITALS
DIASTOLIC BLOOD PRESSURE: 74 MMHG | TEMPERATURE: 97.6 F | HEART RATE: 82 BPM | RESPIRATION RATE: 22 BRPM | OXYGEN SATURATION: 91 % | SYSTOLIC BLOOD PRESSURE: 122 MMHG

## 2025-08-22 DIAGNOSIS — R33.9 URINARY RETENTION: Primary | ICD-10-CM

## 2025-08-22 PROBLEM — R39.15 BENIGN PROSTATIC HYPERPLASIA (BPH) WITH URINARY URGENCY: Status: ACTIVE | Noted: 2018-11-13

## 2025-08-22 PROBLEM — N40.1 BENIGN PROSTATIC HYPERPLASIA (BPH) WITH URINARY URGENCY: Status: ACTIVE | Noted: 2018-11-13

## 2025-08-22 PROBLEM — G20.A1 PARKINSON'S DISEASE (H): Status: ACTIVE | Noted: 2017-11-29

## 2025-08-22 PROBLEM — E11.22 TYPE 2 DIABETES MELLITUS WITH CHRONIC KIDNEY DISEASE (H): Status: ACTIVE | Noted: 2025-08-22

## 2025-08-22 LAB
ALBUMIN UR-MCNC: 30 MG/DL
ANION GAP SERPL CALCULATED.3IONS-SCNC: 14 MMOL/L (ref 7–15)
APPEARANCE UR: CLEAR
BASOPHILS # BLD AUTO: 0.05 10E3/UL (ref 0–0.2)
BASOPHILS NFR BLD AUTO: 0.5 %
BILIRUB UR QL STRIP: NEGATIVE
BUN SERPL-MCNC: 19.7 MG/DL (ref 8–23)
CALCIUM SERPL-MCNC: 9.5 MG/DL (ref 8.8–10.4)
CHLORIDE SERPL-SCNC: 106 MMOL/L (ref 98–107)
COLOR UR AUTO: ABNORMAL
CREAT SERPL-MCNC: 1.15 MG/DL (ref 0.67–1.17)
EGFRCR SERPLBLD CKD-EPI 2021: 68 ML/MIN/1.73M2
EOSINOPHIL # BLD AUTO: <0.03 10E3/UL (ref 0–0.7)
EOSINOPHIL NFR BLD AUTO: 0.1 %
ERYTHROCYTE [DISTWIDTH] IN BLOOD BY AUTOMATED COUNT: 14.7 % (ref 10–15)
GLUCOSE SERPL-MCNC: 167 MG/DL (ref 70–99)
GLUCOSE UR STRIP-MCNC: >1000 MG/DL
HCO3 SERPL-SCNC: 20 MMOL/L (ref 22–29)
HCT VFR BLD AUTO: 45.2 % (ref 40–53)
HGB BLD-MCNC: 15.2 G/DL (ref 13.3–17.7)
HGB UR QL STRIP: ABNORMAL
IMM GRANULOCYTES # BLD: 0.03 10E3/UL
IMM GRANULOCYTES NFR BLD: 0.3 %
KETONES UR STRIP-MCNC: 10 MG/DL
LEUKOCYTE ESTERASE UR QL STRIP: NEGATIVE
LYMPHOCYTES # BLD AUTO: 1.45 10E3/UL (ref 0.8–5.3)
LYMPHOCYTES NFR BLD AUTO: 14.7 %
MCH RBC QN AUTO: 30.9 PG (ref 26.5–33)
MCHC RBC AUTO-ENTMCNC: 33.6 G/DL (ref 31.5–36.5)
MCV RBC AUTO: 91.9 FL (ref 78–100)
MONOCYTES # BLD AUTO: 1.02 10E3/UL (ref 0–1.3)
MONOCYTES NFR BLD AUTO: 10.3 %
MUCOUS THREADS #/AREA URNS LPF: PRESENT /LPF
NEUTROPHILS # BLD AUTO: 7.3 10E3/UL (ref 1.6–8.3)
NEUTROPHILS NFR BLD AUTO: 74.1 %
NITRATE UR QL: NEGATIVE
NRBC # BLD AUTO: <0.03 10E3/UL
NRBC BLD AUTO-RTO: 0 /100
PH UR STRIP: 5.5 [PH] (ref 5–7)
PLATELET # BLD AUTO: 268 10E3/UL (ref 150–450)
POTASSIUM SERPL-SCNC: 3.4 MMOL/L (ref 3.4–5.3)
RBC # BLD AUTO: 4.92 10E6/UL (ref 4.4–5.9)
RBC URINE: 5 /HPF
SODIUM SERPL-SCNC: 140 MMOL/L (ref 135–145)
SP GR UR STRIP: 1.03 (ref 1–1.03)
UROBILINOGEN UR STRIP-MCNC: NORMAL MG/DL
WBC # BLD AUTO: 9.86 10E3/UL (ref 4–11)
WBC URINE: 2 /HPF

## 2025-08-22 PROCEDURE — 36415 COLL VENOUS BLD VENIPUNCTURE: CPT | Performed by: EMERGENCY MEDICINE

## 2025-08-22 PROCEDURE — 99284 EMERGENCY DEPT VISIT MOD MDM: CPT | Performed by: EMERGENCY MEDICINE

## 2025-08-22 PROCEDURE — 81001 URINALYSIS AUTO W/SCOPE: CPT

## 2025-08-22 PROCEDURE — 85004 AUTOMATED DIFF WBC COUNT: CPT | Performed by: EMERGENCY MEDICINE

## 2025-08-22 PROCEDURE — 80048 BASIC METABOLIC PNL TOTAL CA: CPT | Performed by: EMERGENCY MEDICINE

## 2025-08-22 RX ORDER — TAMSULOSIN HYDROCHLORIDE 0.4 MG/1
0.4 CAPSULE ORAL DAILY
Qty: 14 CAPSULE | Refills: 0 | Status: SHIPPED | OUTPATIENT
Start: 2025-08-22

## 2025-08-22 ASSESSMENT — ACTIVITIES OF DAILY LIVING (ADL)
ADLS_ACUITY_SCORE: 41

## 2025-08-22 ASSESSMENT — COLUMBIA-SUICIDE SEVERITY RATING SCALE - C-SSRS
2. HAVE YOU ACTUALLY HAD ANY THOUGHTS OF KILLING YOURSELF IN THE PAST MONTH?: NO
6. HAVE YOU EVER DONE ANYTHING, STARTED TO DO ANYTHING, OR PREPARED TO DO ANYTHING TO END YOUR LIFE?: NO
1. IN THE PAST MONTH, HAVE YOU WISHED YOU WERE DEAD OR WISHED YOU COULD GO TO SLEEP AND NOT WAKE UP?: NO

## 2025-08-23 ENCOUNTER — APPOINTMENT (OUTPATIENT)
Dept: CT IMAGING | Facility: CLINIC | Age: 75
End: 2025-08-23
Attending: EMERGENCY MEDICINE
Payer: COMMERCIAL

## 2025-08-23 ENCOUNTER — HOSPITAL ENCOUNTER (EMERGENCY)
Facility: CLINIC | Age: 75
Discharge: HOME | End: 2025-08-23
Attending: EMERGENCY MEDICINE | Admitting: EMERGENCY MEDICINE
Payer: COMMERCIAL

## 2025-08-23 PROCEDURE — 74176 CT ABD & PELVIS W/O CONTRAST: CPT

## 2025-08-23 ASSESSMENT — ACTIVITIES OF DAILY LIVING (ADL)
ADLS_ACUITY_SCORE: 41

## 2025-08-23 ASSESSMENT — COLUMBIA-SUICIDE SEVERITY RATING SCALE - C-SSRS
1. IN THE PAST MONTH, HAVE YOU WISHED YOU WERE DEAD OR WISHED YOU COULD GO TO SLEEP AND NOT WAKE UP?: NO
2. HAVE YOU ACTUALLY HAD ANY THOUGHTS OF KILLING YOURSELF IN THE PAST MONTH?: NO
6. HAVE YOU EVER DONE ANYTHING, STARTED TO DO ANYTHING, OR PREPARED TO DO ANYTHING TO END YOUR LIFE?: NO

## 2025-08-25 ENCOUNTER — HOSPITAL ENCOUNTER (EMERGENCY)
Facility: CLINIC | Age: 75
Discharge: HOME | End: 2025-08-25
Admitting: EMERGENCY MEDICINE
Payer: COMMERCIAL

## 2025-08-25 VITALS
SYSTOLIC BLOOD PRESSURE: 140 MMHG | HEART RATE: 70 BPM | RESPIRATION RATE: 16 BRPM | DIASTOLIC BLOOD PRESSURE: 81 MMHG | OXYGEN SATURATION: 97 % | TEMPERATURE: 97.3 F

## 2025-08-25 PROCEDURE — 999N000104 HC STATISTIC NO CHARGE

## 2025-08-25 PROCEDURE — 99281 EMR DPT VST MAYX REQ PHY/QHP: CPT

## 2025-08-25 ASSESSMENT — COLUMBIA-SUICIDE SEVERITY RATING SCALE - C-SSRS
1. IN THE PAST MONTH, HAVE YOU WISHED YOU WERE DEAD OR WISHED YOU COULD GO TO SLEEP AND NOT WAKE UP?: NO
6. HAVE YOU EVER DONE ANYTHING, STARTED TO DO ANYTHING, OR PREPARED TO DO ANYTHING TO END YOUR LIFE?: NO
2. HAVE YOU ACTUALLY HAD ANY THOUGHTS OF KILLING YOURSELF IN THE PAST MONTH?: NO

## 2025-08-25 ASSESSMENT — ACTIVITIES OF DAILY LIVING (ADL): ADLS_ACUITY_SCORE: 41

## 2025-09-02 ENCOUNTER — HOSPITAL ENCOUNTER (EMERGENCY)
Facility: CLINIC | Age: 75
Discharge: HOME | End: 2025-09-02
Attending: FAMILY MEDICINE | Admitting: FAMILY MEDICINE
Payer: COMMERCIAL

## 2025-09-02 ENCOUNTER — TELEPHONE (OUTPATIENT)
Dept: UROLOGY | Facility: CLINIC | Age: 75
End: 2025-09-02

## 2025-09-02 VITALS
HEIGHT: 68 IN | WEIGHT: 188 LBS | OXYGEN SATURATION: 96 % | BODY MASS INDEX: 28.49 KG/M2 | DIASTOLIC BLOOD PRESSURE: 72 MMHG | SYSTOLIC BLOOD PRESSURE: 124 MMHG | HEART RATE: 67 BPM | TEMPERATURE: 98.6 F | RESPIRATION RATE: 16 BRPM

## 2025-09-02 DIAGNOSIS — T83.011A MALFUNCTION OF FOLEY CATHETER, INITIAL ENCOUNTER: Primary | ICD-10-CM

## 2025-09-02 LAB
ALBUMIN UR-MCNC: 50 MG/DL
APPEARANCE UR: ABNORMAL
BILIRUB UR QL STRIP: NEGATIVE
COLOR UR AUTO: ABNORMAL
GLUCOSE UR STRIP-MCNC: >1000 MG/DL
HGB UR QL STRIP: ABNORMAL
KETONES UR STRIP-MCNC: ABNORMAL MG/DL
LEUKOCYTE ESTERASE UR QL STRIP: NEGATIVE
NITRATE UR QL: NEGATIVE
PH UR STRIP: 5.5 [PH] (ref 5–7)
RBC URINE: >182 /HPF
SP GR UR STRIP: 1.02 (ref 1–1.03)
UROBILINOGEN UR STRIP-MCNC: NORMAL MG/DL
WBC URINE: 4 /HPF

## 2025-09-02 PROCEDURE — 81003 URINALYSIS AUTO W/O SCOPE: CPT | Performed by: FAMILY MEDICINE

## 2025-09-02 PROCEDURE — 99283 EMERGENCY DEPT VISIT LOW MDM: CPT | Performed by: FAMILY MEDICINE

## 2025-09-02 PROCEDURE — 250N000009 HC RX 250: Performed by: FAMILY MEDICINE

## 2025-09-02 PROCEDURE — 99283 EMERGENCY DEPT VISIT LOW MDM: CPT | Mod: 25 | Performed by: FAMILY MEDICINE

## 2025-09-02 PROCEDURE — 51702 INSERT TEMP BLADDER CATH: CPT | Performed by: FAMILY MEDICINE

## 2025-09-02 RX ORDER — LIDOCAINE HYDROCHLORIDE 20 MG/ML
JELLY TOPICAL ONCE
Status: COMPLETED | OUTPATIENT
Start: 2025-09-02 | End: 2025-09-02

## 2025-09-02 RX ADMIN — LIDOCAINE HYDROCHLORIDE: 20 JELLY TOPICAL at 09:20

## 2025-09-02 ASSESSMENT — ACTIVITIES OF DAILY LIVING (ADL)
ADLS_ACUITY_SCORE: 41